# Patient Record
Sex: MALE | Race: WHITE | Employment: FULL TIME | URBAN - METROPOLITAN AREA
[De-identification: names, ages, dates, MRNs, and addresses within clinical notes are randomized per-mention and may not be internally consistent; named-entity substitution may affect disease eponyms.]

---

## 2020-10-01 ENCOUNTER — OFFICE VISIT (OUTPATIENT)
Dept: NEUROLOGY | Facility: CLINIC | Age: 45
End: 2020-10-01
Payer: COMMERCIAL

## 2020-10-01 VITALS
HEART RATE: 81 BPM | BODY MASS INDEX: 26.61 KG/M2 | HEIGHT: 78 IN | DIASTOLIC BLOOD PRESSURE: 84 MMHG | RESPIRATION RATE: 18 BRPM | WEIGHT: 230 LBS | SYSTOLIC BLOOD PRESSURE: 144 MMHG

## 2020-10-01 DIAGNOSIS — H53.413 VISION LOSS, CENTRAL, BILATERAL: Primary | ICD-10-CM

## 2020-10-01 DIAGNOSIS — R90.89 ABNORMAL FINDING ON MRI OF BRAIN: ICD-10-CM

## 2020-10-01 PROCEDURE — 99205 OFFICE O/P NEW HI 60 MIN: CPT | Performed by: PSYCHIATRY & NEUROLOGY

## 2020-10-01 RX ORDER — GUAIFENESIN 100 MG/5ML
81 LIQUID (ML) ORAL DAILY
COMMUNITY

## 2020-10-01 NOTE — PROGRESS NOTES
Neurology Consult Note      HISTORY PROVIDED BY: patient    Chief Complaint:   Chief Complaint   Patient presents with    Loss of Vision      Subjective:    Ursula Espinosa is a 38 y.o. right handed male who presents in consultation for vision loss. Pt reminds me that I evaluated him 8 years ago at [facility], for spots in vision and tingling on right side that improved spontaneously. He had an MRI brain at that time with white matter lesions concerning for demyelinating disease. LP was unremarkable with no OCB seen, CSF protein 53, remainder of evaluation was negative (Lyme Ab, RPR, VIVIANA, CSF cytology, Cell counts, Glu, VDRL, culture.) He was too follow up for repeat MRI brain, but had a son and was lost to follow up. He denies any additional symptoms until a spell of vision loss a couple of months ago. He was playing tennis, after 10 minutes, he saw blind spots in bilateral central vision and took a couple of hours to recover. Happened again 3 weeks ago, had just finished shopping, was not doing anything strenuous. Lasted a couple of hours. With this second episode, his face felt numb and people sounded far away. He has also had numbness in hands and feet and has attributed this to sitting throughout the day in a non-erognomic work station at home during the pandemic. Dr. Pacheco Persons checked labs and  and has been given 3 months to get that number down before meds started, o/w work up was negative. Has a dog that is keeping him awake recently, so has been sleep deprived. No HAs associated with these sxs. Has mild HAs o/w. His sister, who is a nurse, had word salad one day, no etiology found, but did have white matter changes seen on MRI brain as well.      Past Medical History:   Diagnosis Date    Kidney stone     Other ill-defined conditions(629.98)     \"lesions on brain\"      Past Surgical History:   Procedure Laterality Date    HX HEENT      wisdom teeth       Social History     Socioeconomic History    Marital status: SINGLE     Spouse name: Not on file    Number of children: Not on file    Years of education: Not on file    Highest education level: Not on file   Occupational History    Occupation: Works at [employer]. Social Needs    Financial resource strain: Not on file    Food insecurity     Worry: Not on file     Inability: Not on file    Transportation needs     Medical: Not on file     Non-medical: Not on file   Tobacco Use    Smoking status: Never Smoker    Smokeless tobacco: Never Used   Substance and Sexual Activity    Alcohol use: Yes     Alcohol/week: 15.0 - 20.0 standard drinks     Types: 15 - 20 Cans of beer per week    Drug use: No    Sexual activity: Not on file   Lifestyle    Physical activity     Days per week: Not on file     Minutes per session: Not on file    Stress: Not on file   Relationships    Social connections     Talks on phone: Not on file     Gets together: Not on file     Attends Voodoo service: Not on file     Active member of club or organization: Not on file     Attends meetings of clubs or organizations: Not on file     Relationship status: Not on file    Intimate partner violence     Fear of current or ex partner: Not on file     Emotionally abused: Not on file     Physically abused: Not on file     Forced sexual activity: Not on file   Other Topics Concern    Not on file   Social History Narrative    Lives in [place] with wife and 9yo son, currently doing school virtually. Family History   Problem Relation Age of Onset    Lung Cancer Mother         Non-smoker    High Cholesterol Mother     Stroke Father         Age 54yo    Other Father         Trauma at young age, inactive    Hypertension Father     High Cholesterol Father     Stroke Paternal Grandfather         Age 67yo         Objective:   Review of Systems   Constitutional: Positive for malaise/fatigue. HENT: Negative. Eyes: Negative. Respiratory: Negative.     Cardiovascular: Negative. Gastrointestinal: Negative. Genitourinary: Negative. Musculoskeletal: Positive for joint pain. Skin: Negative. Neurological: Positive for dizziness and headaches. Endo/Heme/Allergies: Negative. Psychiatric/Behavioral: The patient is nervous/anxious. No Known Allergies     Meds:  Outpatient Medications Prior to Visit   Medication Sig Dispense Refill    aspirin 81 mg chewable tablet Take 81 mg by mouth daily.  oxyCODONE-acetaminophen (PERCOCET) 5-325 mg per tablet Take 1 Tab by mouth every four (4) hours as needed for Pain. 30 Tab 0     No facility-administered medications prior to visit. Imaging:  MRI Results (most recent):  No results found for this or any previous visit. CT Results (most recent):  Results from Hospital Encounter encounter on 05/13/14   CT ABD PELV WO CONT    Narrative **Final Report**       ICD Codes / Adm. Diagnosis: 742738   / Flank Pain    Examination:  CT ABD PELVIS WO CON  - [id number] - May 13 2014  2:47PM  Accession No:  [id number]  Reason:  r flank pain      REPORT:  INDICATION: Right flank pain. Exam: Noncontrast CT of the abdomen and pelvis is performed with 5 mm   collimation. Sagittal and coronal reformatted images were also performed. There is no prior study for direct comparison. FINDINGS:    The visualized lung bases are clear. Abdomen: There is right perinephric stranding, right hydronephrosis and   right hydroureter to level of the distal right ureter where there is a 6 mm   calculus. Additional bilateral nephrolithiasis is noted. The liver, spleen, adrenals, pancreas, gallbladder are normal on noncontrast   images. No thickened or dilated loop of large or small bowel seen. There is   no free intraperitoneal gas or fluid.     Pelvis: Urinary bladder is partially filled and grossly normal.        IMPRESSION: Right perinephric stranding, right hydronephrosis and right   hydroureter to level of the distal right ureter where there is a 6 mm   calculus. Signing/Reading Doctor: ANGI Su ([id number])    Approved: ANGI Su ([id number])  May 13 2014  3:55PM                                       Reviewed records in [facility] and media tab today    Lab Review   Results for orders placed or performed during the hospital encounter of 05/13/14   URINALYSIS W/ REFLEX CULTURE    Specimen: Urine   Result Value Ref Range    Color YELLOW/STRAW     Appearance TURBID (A) CLEAR    Specific gravity 1.020 1.003 - 1.030      pH (UA) 7.5 5.0 - 8.0      Protein 30 (A) NEGATIVE mg/dL    Glucose NEGATIVE  NEGATIVE mg/dL    Ketone TRACE (A) NEGATIVE mg/dL    Bilirubin NEGATIVE  NEGATIVE    Blood MODERATE (A) NEGATIVE    Urobilinogen 0.2 0.2 - 1.0 EU/dL    Nitrites NEGATIVE  NEGATIVE    Leukocyte Esterase NEGATIVE  NEGATIVE    WBC 0-4 0 - 4 /hpf    RBC 20-50 0 - 5 /hpf    Epithelial cells FEW FEW /lpf    Bacteria NEGATIVE  NEGATIVE /hpf    UA:UC IF INDICATED CULTURE NOT INDICATED BY UA RESULT CULTURE NOT INDICATE    Amorphous Crystals 3+ (A) NEGATIVE          Exam:  Visit Vitals  BP (!) 144/84   Pulse 81   Resp 18   Ht 6' 6\" (1.981 m)   Wt 104.3 kg (230 lb)   BMI 26.58 kg/m²     General:  Alert, cooperative, no distress. Head:  Normocephalic, without obvious abnormality, atraumatic. Respiratory:  Heart:   Non labored breathing  Regular rate and rhythm, no murmurs   Neck:   2+ carotids, no bruits   Extremities: Warm, no cyanosis or edema. Pulses: 2+ radial pulses. Neurologic:  MS: Alert and oriented x 4, speech intact. Language intact. Attention and fund of knowledge appropriate. Recent and remote memory intact.   Cranial Nerves:  II: visual fields Full to confrontation   II: pupils Equal, round, reactive to light   II: optic disc    III,VII: ptosis none   III,IV,VI: extraocular muscles  EOMI, no nystagmus or diplopia   V: facial light touch sensation  normal   VII: facial muscle function   symmetric   VIII: hearing intact   IX: soft palate elevation  normal   XI: trapezius strength  5/5   XI: sternocleidomastoid strength 5/5   XII: tongue  Midline     Motor: normal bulk and tone, no tremor              Strength: 5/5 throughout, no PD  Sensory: intact to LT, PP  Coordination: FTN and HTS intact, PEG intact  Gait: normal gait, able to tandem walk  Reflexes: 2+ symmetric, toes downgoing       Assessment/Plan   Pt is a 38 y.o. right handed male with h/o spots in his vision and right sided tingling 8 years ago found to have white matter lesions on MRI brain at that time concerning for demyelinating disease, negative OCB on LP, lost to follow up, now with two episodes of sudden central vision loss bilaterally, the first a couple of months ago and the second 3 weeks ago, lasting for two hours each time, had facial numbness and voices sounded far away during the second spell. Exam is non-focal and unremarkable. Intermittent bilateral painless central vision loss is concerning for possible central retinal artery occlusion or macular abnormality, though it is unusual that this was bilateral, raising concern for decreased perfusion, unlikely to be a chiasmal lesion. Migraine aura is in differential, but was not associated with HA and pt does not have h/o migraine, this is a diagnosis of exclusion. Recommend MRI brain w/wo contrast to reassess previously seen white matter lesions and rule out structural etiology for vision loss, and CTA head and neck to assess for vascular etiology of vision loss. Further work up pending these results. Referral to ophthalmology placed. Will request records and labs from PCP and notes from [facility]. F/u in clinic after studies completed. .    ICD-10-CM ICD-9-CM    1. Vision loss, central, bilateral  H53.413 368.41 MRI BRAIN W WO CONT      CTA HEAD NECK W CONT      REFERRAL TO OPHTHALMOLOGY   2. Abnormal finding on MRI of brain  R90.89 793.0 MRI BRAIN W WO CONT      CTA HEAD NECK W CONT       Signed:   Sheela Kaur, MD  10/1/2020

## 2020-10-01 NOTE — PATIENT INSTRUCTIONS
-Please see an ophthalmologist 
-MRI brain ordered 
-CTA of head and neck ordered 
-Please attempt to obtain prior imaging on CD from [facility] and bring to appointment for MRI so that a comparison can be made.

## 2020-10-01 NOTE — LETTER
10/1/20 Patient: Kendy Chamberlain YOB: 1975 Date of Visit: 10/1/2020 Anibal Santiago MD 
[address] [place] [id number] VIA Facsimile: [phone] Dear Anibal Santiago MD, Thank you for referring Mr. Sebastian Bryson to [address] [place] [address] for evaluation. My notes for this consultation are attached. If you have questions, please do not hesitate to call me. I look forward to following your patient along with you. Sincerely, Patrice Potter MD

## 2020-10-02 ENCOUNTER — DOCUMENTATION ONLY (OUTPATIENT)
Dept: NEUROLOGY | Facility: CLINIC | Age: 45
End: 2020-10-02

## 2020-10-02 NOTE — PROGRESS NOTES
I have requested records and imaging/discs from [facility]. Requested notes and labs from Dr. Elaine Matthews PCP.

## 2020-10-14 ENCOUNTER — TELEPHONE (OUTPATIENT)
Dept: NEUROLOGY | Facility: CLINIC | Age: 45
End: 2020-10-14

## 2020-10-14 NOTE — TELEPHONE ENCOUNTER
----- Message from Noemi Dean sent at 10/13/2020  4:32 PM EDT -----  Regarding: Dr. Ordonez   General Message/Vendor Calls    Caller's first and last name: George w[employer] [address]      Reason for call: Advising pt is having a CT scan of the head and neck w/contrast at [facility]. Case pending with [facility], please call [phone] and follow prompts for peer to peer.        Callback required yes/no and why: No      Best contact number(s): [phone]      Details to clarify the request:      Noemi Dean

## 2020-10-14 NOTE — TELEPHONE ENCOUNTER
Tavia Gonsalez - Do I WANT to, no, WILL I, yes. 14 minutes and 50 seconds of my unpaid time and several absurd questions later, it is approved by a nurse reviewer.      CTA Head and Neck  Authorized [id number] exp 10/13/20 - 12/11/20

## 2020-10-16 ENCOUNTER — HOSPITAL ENCOUNTER (OUTPATIENT)
Dept: MRI IMAGING | Age: 45
Discharge: HOME | End: 2020-10-16
Attending: PSYCHIATRY & NEUROLOGY
Payer: COMMERCIAL

## 2020-10-16 ENCOUNTER — HOSPITAL ENCOUNTER (OUTPATIENT)
Dept: CT IMAGING | Age: 45
Discharge: HOME | End: 2020-10-16
Attending: PSYCHIATRY & NEUROLOGY
Payer: COMMERCIAL

## 2020-10-16 VITALS — BODY MASS INDEX: 26.58 KG/M2 | WEIGHT: 230 LBS

## 2020-10-16 DIAGNOSIS — R90.89 ABNORMAL FINDING ON MRI OF BRAIN: ICD-10-CM

## 2020-10-16 DIAGNOSIS — H53.413 VISION LOSS, CENTRAL, BILATERAL: ICD-10-CM

## 2020-10-16 PROCEDURE — A9575 INJ GADOTERATE MEGLUMI 0.1ML: HCPCS | Performed by: PSYCHIATRY & NEUROLOGY

## 2020-10-16 PROCEDURE — 74011000636 HC RX REV CODE- 636: Performed by: RADIOLOGY

## 2020-10-16 PROCEDURE — 70498 CT ANGIOGRAPHY NECK: CPT

## 2020-10-16 PROCEDURE — 74011000258 HC RX REV CODE- 258: Performed by: RADIOLOGY

## 2020-10-16 PROCEDURE — 74011250636 HC RX REV CODE- 250/636: Performed by: PSYCHIATRY & NEUROLOGY

## 2020-10-16 PROCEDURE — 70496 CT ANGIOGRAPHY HEAD: CPT

## 2020-10-16 PROCEDURE — 70553 MRI BRAIN STEM W/O & W/DYE: CPT

## 2020-10-16 RX ORDER — SODIUM CHLORIDE 0.9 % (FLUSH) 0.9 %
10 SYRINGE (ML) INJECTION
Status: COMPLETED | OUTPATIENT
Start: 2020-10-16 | End: 2020-10-16

## 2020-10-16 RX ORDER — GADOTERATE MEGLUMINE 376.9 MG/ML
20 INJECTION INTRAVENOUS
Status: COMPLETED | OUTPATIENT
Start: 2020-10-16 | End: 2020-10-16

## 2020-10-16 RX ADMIN — GADOTERATE MEGLUMINE 20 ML: 376.9 INJECTION INTRAVENOUS at 13:35

## 2020-10-16 RX ADMIN — SODIUM CHLORIDE 100 ML: 900 INJECTION, SOLUTION INTRAVENOUS at 15:12

## 2020-10-16 RX ADMIN — Medication 10 ML: at 15:12

## 2020-10-16 RX ADMIN — IOPAMIDOL 100 ML: 755 INJECTION, SOLUTION INTRAVENOUS at 15:12

## 2020-10-25 NOTE — PROGRESS NOTES
Isamar Friends - Please call pt: MRI brain w/wo contrast 10/16/20 without acute or concerning findings, can review together at f/u appt. CTA H/N 10/16/20 look good without any areas of narrowing or other etiology for vision loss.

## 2020-10-25 NOTE — PROGRESS NOTES
Dejuan Holden - Please call pt: MRI brain w/wo contrast 10/16/20 without acute or concerning findings, can review together at f/u appt. CTA H/N 10/16/20 look good without any areas of narrowing or other etiology for vision loss.

## 2020-10-25 NOTE — PROGRESS NOTES
Dodie Pool - Please call pt: MRI brain w/wo contrast 10/16/20 without acute or concerning findings, can review together at f/u appt. CTA H/N 10/16/20 look good without any areas of narrowing or other etiology for vision loss.

## 2020-11-04 ENCOUNTER — DOCUMENTATION ONLY (OUTPATIENT)
Dept: NEUROLOGY | Facility: CLINIC | Age: 45
End: 2020-11-04

## 2020-11-04 NOTE — PROGRESS NOTES
Notes from [facility] received and reviewed:  Initial consultation [id number]-dpax-old right-handed male with 3 spells and 5 years of acute onset of right-sided numbness, language disturbance, visual changes, and \"fogginess\" without associated headache with family history of stroke in his father at age 48 years and epilepsy in his sister. Exam was nonfocal CT of the head with bifrontal white matter hyperdensities and asymmetry of the lateral ventricles. EEG 1/24/2012 normal awake EEG  Follow-up visit 2/27/2012-MRI brain with and without contrast at [facility] on 1/30/2012 moderate amount of white matter disease bilaterally with mild abnormal signal along the undersurface of the corpus callosum extending into the anterior left aspect of the body of the corpus callosum concerning for MS. , hemoglobin A1c 5.9  Follow-up visit 4/9/2012-LP 3/20/2012 with normal opening pressure, negative OGB, IgG synthesis rate slightly elevated at 4.0, IgG index was normal, protein slightly elevated at 53, glucose 56, VDRL, B. burgdorferi antibodies negative, cytology negative. Blood work VIVIANA, RPR, Lyme Western blot negative. Recommended follow-up MRI in October 2012. Records sent to scanning.

## 2021-02-18 ENCOUNTER — VIRTUAL VISIT (OUTPATIENT)
Dept: NEUROLOGY | Age: 46
End: 2021-02-18
Payer: COMMERCIAL

## 2021-02-18 DIAGNOSIS — H53.413 VISION LOSS, CENTRAL, BILATERAL: Primary | ICD-10-CM

## 2021-02-18 DIAGNOSIS — R90.89 ABNORMAL FINDING ON MRI OF BRAIN: ICD-10-CM

## 2021-02-18 PROCEDURE — 99213 OFFICE O/P EST LOW 20 MIN: CPT | Performed by: PSYCHIATRY & NEUROLOGY

## 2021-02-18 NOTE — PROGRESS NOTES
Neurology Consult Note      HISTORY PROVIDED BY: patient  Juarez Gold is a 41 y.o. male who was seen by synchronous (real-time) audio-video technology on 2/18/2021. Two factor identification completed. Consent:  He and/or his healthcare decision maker is aware that this patient-initiated Telehealth encounter is a billable service, with coverage as determined by his insurance carrier. He is aware that he may receive a bill and has provided verbal consent to proceed: Yes    I was at home while conducting this encounter. I discussed with the patient the nature of our telemedicine visit: Our sessions are not being recorded and personal health information is protected. We will provide follow up care in person when the patient needs it. Pursuant to the emergency declaration under the [address] [place] [address], [facility] waiver authority and the [employer] Act, this Virtual  Visit was conducted, with patient's consent, to reduce the patient's risk of exposure to COVID-19 and provide continuity of care for an established patient. Chief Complaint:   Chief Complaint   Patient presents with    Follow-up     vision loss, \"it has been fine I have not had any new episodes of vision loss. \"      Subjective:   Pt is a 41 y.o. right handed male initially and last seen on 10/1/20 with h/o spots in his vision and right sided tingling 8 years ago found to have white matter lesions on MRI brain at that time concerning for demyelinating disease, negative OCB on LP, lost to follow up, now with two episodes of sudden central vision loss bilaterally, the first a couple of months ago and the second 3 weeks ago, lasting for two hours each time, had facial numbness and voices sounded far away during the second spell. Exam was non-focal and unremarkable.   Intermittent bilateral painless central vision loss is concerning for possible central retinal artery occlusion or macular abnormality, though it is unusual that this was bilateral, raising concern for decreased perfusion, unlikely to be a chiasmal lesion. Migraine aura is in differential, but was not associated with HA and pt does not have h/o migraine, this is a diagnosis of exclusion. Recommended MRI brain w/wo contrast to reassess previously seen white matter lesions and rule out structural etiology for vision loss, and CTA head and neck to assess for vascular etiology of vision loss. Further work up pending these results. Referral to ophthalmology placed. Reequested records and labs from PCP and notes from [facility]. He returns for virtual f/u. MRI brain w/wo contrast 10/16/20 without acute or concerning findings, white matter lesions seen, felt to be more c/w CIWM changes and atypical for demyelinating disease. CTA H/N 10/16/20 look good without any areas of narrowing or other etiology for vision loss. He has not had any further episodes of vision loss. He had hand and foot numbness, intermittent improved with changing position at his desk, using a standup desk now. He denies HTN, he does have HLD - has changed his diet and increased CV activity and PCP plans to recheck in a couple of months, no smoking, no DM, no sleep apnea. Notes from [facility] received and reviewed:  Initial consultation [id number]-old right-handed male with 3 spells and 5 years of acute onset of right-sided numbness, language disturbance, visual changes, and \"fogginess\" without associated headache with family history of stroke in his father at age 48 years and epilepsy in his sister. CT of the head with bifrontal white matter hyperdensities and asymmetry of the lateral ventricles. EEG 1/24/2012 normal awake EEG.  MRI brain with and without contrast at [address] on 1/30/2012 moderate amount of white matter disease bilaterally with mild abnormal signal along the undersurface of the corpus callosum extending into the anterior left aspect of the body of the corpus callosum concerning for MS. LP 3/20/2012 with normal opening pressure, negative OGB, IgG synthesis rate slightly elevated at 4.0, IgG index was normal, protein slightly elevated at 53, glucose 56,  VDRL, B. burgdorferi antibodies negative, cytology negative. Blood work VIVIANA, RPR, Lyme Western blot negative. Past Medical History:   Diagnosis Date    Kidney stone     Other ill-defined conditions(372.92)     \"lesions on brain\"      Past Surgical History:   Procedure Laterality Date    HX HEENT      wisdom teeth       Social History     Socioeconomic History    Marital status: SINGLE     Spouse name: Not on file    Number of children: Not on file    Years of education: Not on file    Highest education level: Not on file   Occupational History    Occupation: Works at [employer]. Social Needs    Financial resource strain: Not on file    Food insecurity     Worry: Not on file     Inability: Not on file    Transportation needs     Medical: Not on file     Non-medical: Not on file   Tobacco Use    Smoking status: Never Smoker    Smokeless tobacco: Never Used   Substance and Sexual Activity    Alcohol use:  Yes     Alcohol/week: 15.0 - 20.0 standard drinks     Types: 15 - 20 Cans of beer per week    Drug use: No    Sexual activity: Not on file   Lifestyle    Physical activity     Days per week: Not on file     Minutes per session: Not on file    Stress: Not on file   Relationships    Social connections     Talks on phone: Not on file     Gets together: Not on file     Attends Confucianist service: Not on file     Active member of club or organization: Not on file     Attends meetings of clubs or organizations: Not on file     Relationship status: Not on file    Intimate partner violence     Fear of current or ex partner: Not on file     Emotionally abused: Not on file     Physically abused: Not on file     Forced sexual activity: Not on file   Other Topics Concern    Not on file   Social History Narrative    Lives in [place] with wife and 8yo son, currently doing school virtually. Family History   Problem Relation Age of Onset    Lung Cancer Mother         Non-smoker    High Cholesterol Mother     Stroke Father         Age 47yo    Other Father         Trauma at young age, inactive    Hypertension Father     High Cholesterol Father     Stroke Paternal Grandfather         Age 67yo         Objective:   ROS: Per HPI o/w reviewed and negative    No Known Allergies     Meds:  Outpatient Medications Prior to Visit   Medication Sig Dispense Refill    aspirin 81 mg chewable tablet Take 81 mg by mouth daily. No facility-administered medications prior to visit. Imaging:  MRI Results (most recent):  Results from [place] encounter on 10/16/20   MRI BRAIN W WO CONT    Narrative EXAM:  MRI BRAIN W WO CONT    INDICATION:    Pt with h/o white matter lesions concerning for demyelinating  disease, now with sudden bilateral central vision loss    COMPARISON:  None. CONTRAST: 20 ml Dotarem. TECHNIQUE:    Multiplanar multisequence acquisition without and with contrast of the brain and  orbits. FINDINGS:  The globes are normal. The optic nerves are normal in size and signal. The  extraocular muscles are normal. No evidence of intraorbital mass or abnormal  intraorbital enhancement. The ventricles are normal in size and position. There are scattered  predominantly subcortical T2/FLAIR white matter hyperintensities, with no  involvement of the callosal septal interface, most suggestive of mild chronic  microvascular ischemic disease. There is no acute infarct, hemorrhage,  extra-axial fluid collection, or mass effect. There is no cerebellar tonsillar  herniation. Expected arterial flow-voids are present. No evidence of abnormal  enhancement. The paranasal sinuses, mastoid air cells, and middle ears are clear.  No  significant osseous or scalp lesions are identified. Impression IMPRESSION:     1. Normal appearance of the orbits. No acute intracranial abnormality. 2. Scattered predominantly subcortical T2/FLAIR white matter hyperintensities,  most suggestive of mild chronic microvascular ischemic disease. The distribution  would be very atypical for demyelinating disease. CT Results (most recent):  Results from Hospital Encounter encounter on 10/16/20   CTA NECK    Narrative EXAM:  CTA HEAD, CTA NECK  Pelvis: Sudden onset bilateral headache  INDICATION:   Pt with sudden onset of bilateral central vision loss, eval for  vascular etiology    COMPARISON:  None    CONTRAST: mL of [id number]. TECHNIQUE:   Unenhanced CT of the head was performed. Following this, multislice helical CT  angiography of the head and neck was performed during uneventful rapid bolus  intravenous administration of contrast. Coronal and sagittal reformations and  MIP images and 3D shaded surface display renderings were generated. CT dose  reduction was achieved through use of a standardized protocol tailored for this  examination and automatic exposure control for dose modulation. FINDINGS:  Unenhanced CT:  The ventricles are midline without hydrocephalus. There is no acute intra or  extra-axial fluid collection. Few scattered foci of hypodensity in the cerebral  white matter on the pattern suggestive of chronic microvascular ischemic change. No pulmonary mass or nodule. .  No large thyroid lesion. .    CTA:    Three-vessel aortic arch. Left vertebral artery is larger than the right  vertebral artery. No dissection cervical soft tissues are grossly unremarkable. Cervical soft tissues are within normal limits. . The basilar artery and its  branches are normal.    The A2 segments are patent. A1 segments are present. M1 and M2 segments are  patent. Symmetric arborization. P1 segments are within normal limits. P2  segments are within normal limits.  The internal carotid, anterior cerebral, and  middle cerebral arteries are patent. There is no flow-limiting intracranial  stenosis. There is no aneurysm. There are no sizable posterior communicating  arteries. Impression IMPRESSION:  Scattered hypodensities in the cerebral white matter are nonspecific. Most  likely related to chronic microvascular ischemic change . There is no major vessel occlusion. No flow limiting stenosis or intracranial  aneurysm. Reviewed records in [facility] today    Lab Review   Results for orders placed or performed during the hospital encounter of 05/13/14   URINALYSIS W/ REFLEX CULTURE    Specimen: Urine   Result Value Ref Range    Color YELLOW/STRAW     Appearance TURBID (A) CLEAR    Specific gravity 1.020 1.003 - 1.030      pH (UA) 7.5 5.0 - 8.0      Protein 30 (A) NEGATIVE mg/dL    Glucose NEGATIVE  NEGATIVE mg/dL    Ketone TRACE (A) NEGATIVE mg/dL    Bilirubin NEGATIVE  NEGATIVE    Blood MODERATE (A) NEGATIVE    Urobilinogen 0.2 0.2 - 1.0 EU/dL    Nitrites NEGATIVE  NEGATIVE    Leukocyte Esterase NEGATIVE  NEGATIVE    WBC 0-4 0 - 4 /hpf    RBC 20-50 0 - 5 /hpf    Epithelial cells FEW FEW /lpf    Bacteria NEGATIVE  NEGATIVE /hpf    UA:UC IF INDICATED CULTURE NOT INDICATED BY UA RESULT CULTURE NOT INDICATE    Amorphous Crystals 3+ (A) NEGATIVE        Exam limited due to VV, unable to assess VS  Exam:  There were no vitals taken for this visit. General:  Alert, cooperative, no distress. Head:  Normocephalic, without obvious abnormality, atraumatic. Respiratory:  Heart:   Non labored breathing     Neck:      Extremities:    Pulses:        Neurologic:  MS: Alert and oriented x 4, speech intact. Language intact. Attention and fund of knowledge appropriate. Recent and remote memory intact.     Exam Oct, 2020:  Cranial Nerves:  II: visual fields Full to confrontation   II: pupils Equal, round, reactive to light   II: optic disc    III,VII: ptosis none III,IV,VI: extraocular muscles  EOMI, no nystagmus or diplopia   V: facial light touch sensation  normal   VII: facial muscle function   symmetric   VIII: hearing intact   IX: soft palate elevation  normal   XI: trapezius strength  5/5   XI: sternocleidomastoid strength 5/5   XII: tongue  Midline     Motor: normal bulk and tone, no tremor              Strength: 5/5 throughout, no PD  Sensory: intact to LT, PP  Coordination: FTN and HTS intact, PEG intact  Gait: normal gait, able to tandem walk  Reflexes: 2+ symmetric, toes downgoing       Assessment/Plan   Pt is a 41 y.o. right handed male presenting in Oct, 2020 with h/o spots in his vision and right sided tingling 8 years ago found to have white matter lesions on MRI brain at that time concerning for demyelinating disease, negative OCB on LP, now with two episodes of sudden central vision loss bilaterally, the first a couple of months prior and the second 3 weeks prior, lasting for two hours each time, had facial numbness and voices sounded far away during the second spell. Exam was non-focal and unremarkable. Intermittent bilateral painless central vision loss is concerning for possible central retinal artery occlusion or macular abnormality, though it is unusual that this was bilateral, raising concern for decreased perfusion, unlikely to be a chiasmal lesion. Migraine aura is in differential, but was not associated with HA and pt does not have h/o migraine. MRI brain w/wo contrast 10/16/20 and CTA H/N 10/16/20 without any concerning findings or etiology for vision loss. New c/o intermittent numbness in hands and feet, likely positional and related to compression with positioning, could have mild mononeuropathies in UE, but not concerning unless numbness were to become more persistent. Discussed chronic ischemic changes on MRI. He has risk factor of HLD, being evaluated and managed by PCP. Recommend continuing ASA 81mg daily.    F/u in clinic in 6 months, instructed to call in the interim if needed. .    ICD-10-CM ICD-9-CM    1. Vision loss, central, bilateral  H53.413 368.41    2. Abnormal finding on MRI of brain  R90.89 793.0        Signed:   Albert Wen MD  2/18/2021

## 2023-08-01 SDOH — HEALTH STABILITY: PHYSICAL HEALTH: ON AVERAGE, HOW MANY MINUTES DO YOU ENGAGE IN EXERCISE AT THIS LEVEL?: 30 MIN

## 2023-08-01 SDOH — HEALTH STABILITY: PHYSICAL HEALTH: ON AVERAGE, HOW MANY DAYS PER WEEK DO YOU ENGAGE IN MODERATE TO STRENUOUS EXERCISE (LIKE A BRISK WALK)?: 4 DAYS

## 2023-08-02 ENCOUNTER — OFFICE VISIT (OUTPATIENT)
Age: 48
End: 2023-08-02
Payer: COMMERCIAL

## 2023-08-02 VITALS
HEART RATE: 60 BPM | HEIGHT: 78 IN | BODY MASS INDEX: 26.61 KG/M2 | DIASTOLIC BLOOD PRESSURE: 92 MMHG | SYSTOLIC BLOOD PRESSURE: 140 MMHG | RESPIRATION RATE: 16 BRPM | OXYGEN SATURATION: 98 % | WEIGHT: 230 LBS | TEMPERATURE: 97.7 F

## 2023-08-02 DIAGNOSIS — Z13.1 SCREENING FOR DIABETES MELLITUS: ICD-10-CM

## 2023-08-02 DIAGNOSIS — Z12.11 SCREENING FOR COLON CANCER: ICD-10-CM

## 2023-08-02 DIAGNOSIS — R05.8 POST-VIRAL COUGH SYNDROME: Primary | ICD-10-CM

## 2023-08-02 DIAGNOSIS — Z12.5 SCREENING FOR PROSTATE CANCER: ICD-10-CM

## 2023-08-02 DIAGNOSIS — R03.0 ELEVATED BLOOD-PRESSURE READING WITHOUT DIAGNOSIS OF HYPERTENSION: ICD-10-CM

## 2023-08-02 DIAGNOSIS — E66.3 OVERWEIGHT (BMI 25.0-29.9): ICD-10-CM

## 2023-08-02 DIAGNOSIS — E78.2 MIXED HYPERLIPIDEMIA: ICD-10-CM

## 2023-08-02 PROBLEM — N20.0 KIDNEY STONES: Status: ACTIVE | Noted: 2023-08-02

## 2023-08-02 PROCEDURE — 99204 OFFICE O/P NEW MOD 45 MIN: CPT | Performed by: NURSE PRACTITIONER

## 2023-08-02 RX ORDER — BENZONATATE 200 MG/1
1 CAPSULE ORAL 3 TIMES DAILY PRN
COMMUNITY
Start: 2023-07-27

## 2023-08-02 ASSESSMENT — ENCOUNTER SYMPTOMS
EYE REDNESS: 0
SINUS PRESSURE: 0
RHINORRHEA: 0
NAUSEA: 0
BLOOD IN STOOL: 0
CHEST TIGHTNESS: 0
DIARRHEA: 0
EYES NEGATIVE: 1
BACK PAIN: 0
COUGH: 1
SINUS PAIN: 0
ABDOMINAL PAIN: 0
GASTROINTESTINAL NEGATIVE: 1
SHORTNESS OF BREATH: 0
VOMITING: 0
EYE PAIN: 0
CONSTIPATION: 0

## 2023-08-02 ASSESSMENT — PATIENT HEALTH QUESTIONNAIRE - PHQ9
SUM OF ALL RESPONSES TO PHQ QUESTIONS 1-9: 0
1. LITTLE INTEREST OR PLEASURE IN DOING THINGS: 0
2. FEELING DOWN, DEPRESSED OR HOPELESS: 0
SUM OF ALL RESPONSES TO PHQ9 QUESTIONS 1 & 2: 0

## 2023-08-24 DIAGNOSIS — Z12.5 SCREENING FOR PROSTATE CANCER: ICD-10-CM

## 2023-08-24 DIAGNOSIS — E78.2 MIXED HYPERLIPIDEMIA: ICD-10-CM

## 2023-08-24 DIAGNOSIS — Z13.1 SCREENING FOR DIABETES MELLITUS: ICD-10-CM

## 2023-08-24 DIAGNOSIS — R05.8 POST-VIRAL COUGH SYNDROME: ICD-10-CM

## 2023-08-24 DIAGNOSIS — R03.0 ELEVATED BLOOD-PRESSURE READING WITHOUT DIAGNOSIS OF HYPERTENSION: ICD-10-CM

## 2023-08-24 LAB
ALBUMIN SERPL-MCNC: 3.9 G/DL (ref 3.5–5)
ALBUMIN/GLOB SERPL: 1.2 (ref 1.1–2.2)
ALP SERPL-CCNC: 91 U/L (ref 45–117)
ALT SERPL-CCNC: 27 U/L (ref 12–78)
ANION GAP SERPL CALC-SCNC: 2 MMOL/L (ref 5–15)
AST SERPL-CCNC: 14 U/L (ref 15–37)
BASOPHILS # BLD: 0 K/UL (ref 0–0.1)
BASOPHILS NFR BLD: 1 % (ref 0–1)
BILIRUB SERPL-MCNC: 0.5 MG/DL (ref 0.2–1)
BUN SERPL-MCNC: 15 MG/DL (ref 6–20)
BUN/CREAT SERPL: 13 (ref 12–20)
CALCIUM SERPL-MCNC: 9.1 MG/DL (ref 8.5–10.1)
CHLORIDE SERPL-SCNC: 107 MMOL/L (ref 97–108)
CHOLEST SERPL-MCNC: 281 MG/DL
CO2 SERPL-SCNC: 29 MMOL/L (ref 21–32)
CREAT SERPL-MCNC: 1.19 MG/DL (ref 0.7–1.3)
CREAT UR-MCNC: 192 MG/DL
DIFFERENTIAL METHOD BLD: NORMAL
EOSINOPHIL # BLD: 0.1 K/UL (ref 0–0.4)
EOSINOPHIL NFR BLD: 1 % (ref 0–7)
ERYTHROCYTE [DISTWIDTH] IN BLOOD BY AUTOMATED COUNT: 12.2 % (ref 11.5–14.5)
EST. AVERAGE GLUCOSE BLD GHB EST-MCNC: 105 MG/DL
GLOBULIN SER CALC-MCNC: 3.3 G/DL (ref 2–4)
GLUCOSE SERPL-MCNC: 103 MG/DL (ref 65–100)
HBA1C MFR BLD: 5.3 % (ref 4–5.6)
HCT VFR BLD AUTO: 44.5 % (ref 36.6–50.3)
HDLC SERPL-MCNC: 41 MG/DL
HDLC SERPL: 6.9 (ref 0–5)
HGB BLD-MCNC: 14.5 G/DL (ref 12.1–17)
IMM GRANULOCYTES # BLD AUTO: 0 K/UL (ref 0–0.04)
IMM GRANULOCYTES NFR BLD AUTO: 0 % (ref 0–0.5)
LDLC SERPL CALC-MCNC: 192.2 MG/DL (ref 0–100)
LYMPHOCYTES # BLD: 1.7 K/UL (ref 0.8–3.5)
LYMPHOCYTES NFR BLD: 43 % (ref 12–49)
MCH RBC QN AUTO: 30.5 PG (ref 26–34)
MCHC RBC AUTO-ENTMCNC: 32.6 G/DL (ref 30–36.5)
MCV RBC AUTO: 93.5 FL (ref 80–99)
MICROALBUMIN UR-MCNC: 0.73 MG/DL
MICROALBUMIN/CREAT UR-RTO: 4 MG/G (ref 0–30)
MONOCYTES # BLD: 0.4 K/UL (ref 0–1)
MONOCYTES NFR BLD: 9 % (ref 5–13)
NEUTS SEG # BLD: 1.9 K/UL (ref 1.8–8)
NEUTS SEG NFR BLD: 46 % (ref 32–75)
NRBC # BLD: 0 K/UL (ref 0–0.01)
NRBC BLD-RTO: 0 PER 100 WBC
PLATELET # BLD AUTO: 155 K/UL (ref 150–400)
PMV BLD AUTO: 10.8 FL (ref 8.9–12.9)
POTASSIUM SERPL-SCNC: 4.2 MMOL/L (ref 3.5–5.1)
PROT SERPL-MCNC: 7.2 G/DL (ref 6.4–8.2)
PSA SERPL-MCNC: 0.5 NG/ML (ref 0.01–4)
RBC # BLD AUTO: 4.76 M/UL (ref 4.1–5.7)
SODIUM SERPL-SCNC: 138 MMOL/L (ref 136–145)
TRIGL SERPL-MCNC: 239 MG/DL
VLDLC SERPL CALC-MCNC: 47.8 MG/DL
WBC # BLD AUTO: 4.1 K/UL (ref 4.1–11.1)

## 2023-08-30 SDOH — ECONOMIC STABILITY: INCOME INSECURITY: HOW HARD IS IT FOR YOU TO PAY FOR THE VERY BASICS LIKE FOOD, HOUSING, MEDICAL CARE, AND HEATING?: NOT HARD AT ALL

## 2023-08-30 SDOH — ECONOMIC STABILITY: TRANSPORTATION INSECURITY
IN THE PAST 12 MONTHS, HAS LACK OF TRANSPORTATION KEPT YOU FROM MEETINGS, WORK, OR FROM GETTING THINGS NEEDED FOR DAILY LIVING?: NO

## 2023-08-30 SDOH — ECONOMIC STABILITY: FOOD INSECURITY: WITHIN THE PAST 12 MONTHS, YOU WORRIED THAT YOUR FOOD WOULD RUN OUT BEFORE YOU GOT MONEY TO BUY MORE.: NEVER TRUE

## 2023-08-30 SDOH — ECONOMIC STABILITY: FOOD INSECURITY: WITHIN THE PAST 12 MONTHS, THE FOOD YOU BOUGHT JUST DIDN'T LAST AND YOU DIDN'T HAVE MONEY TO GET MORE.: NEVER TRUE

## 2023-08-30 SDOH — ECONOMIC STABILITY: HOUSING INSECURITY
IN THE LAST 12 MONTHS, WAS THERE A TIME WHEN YOU DID NOT HAVE A STEADY PLACE TO SLEEP OR SLEPT IN A SHELTER (INCLUDING NOW)?: NO

## 2023-08-31 ENCOUNTER — OFFICE VISIT (OUTPATIENT)
Age: 48
End: 2023-08-31
Payer: COMMERCIAL

## 2023-08-31 VITALS
WEIGHT: 228 LBS | DIASTOLIC BLOOD PRESSURE: 75 MMHG | SYSTOLIC BLOOD PRESSURE: 116 MMHG | TEMPERATURE: 98 F | HEART RATE: 65 BPM | HEIGHT: 78 IN | BODY MASS INDEX: 26.38 KG/M2 | RESPIRATION RATE: 16 BRPM | OXYGEN SATURATION: 99 %

## 2023-08-31 DIAGNOSIS — E66.3 OVERWEIGHT (BMI 25.0-29.9): ICD-10-CM

## 2023-08-31 DIAGNOSIS — R03.0 ELEVATED BLOOD-PRESSURE READING WITHOUT DIAGNOSIS OF HYPERTENSION: ICD-10-CM

## 2023-08-31 DIAGNOSIS — R05.3 PERSISTENT COUGH: Primary | ICD-10-CM

## 2023-08-31 DIAGNOSIS — E78.2 MIXED HYPERLIPIDEMIA: ICD-10-CM

## 2023-08-31 DIAGNOSIS — N20.0 KIDNEY STONES: ICD-10-CM

## 2023-08-31 DIAGNOSIS — R73.01 ELEVATED FASTING GLUCOSE: ICD-10-CM

## 2023-08-31 PROCEDURE — 99204 OFFICE O/P NEW MOD 45 MIN: CPT | Performed by: NURSE PRACTITIONER

## 2023-08-31 RX ORDER — CETIRIZINE HYDROCHLORIDE 10 MG/1
10 TABLET ORAL DAILY
Qty: 30 TABLET | Refills: 0 | Status: SHIPPED | OUTPATIENT
Start: 2023-08-31 | End: 2023-09-30

## 2023-08-31 RX ORDER — OMEPRAZOLE 40 MG/1
40 CAPSULE, DELAYED RELEASE ORAL
Qty: 90 CAPSULE | Refills: 1 | Status: SHIPPED | OUTPATIENT
Start: 2023-08-31

## 2023-08-31 RX ORDER — ATORVASTATIN CALCIUM 20 MG/1
20 TABLET, FILM COATED ORAL DAILY
Qty: 90 TABLET | Refills: 1 | Status: SHIPPED | OUTPATIENT
Start: 2023-08-31

## 2023-08-31 RX ORDER — METHYLPREDNISOLONE 4 MG/1
TABLET ORAL
Qty: 1 KIT | Refills: 0 | Status: SHIPPED | OUTPATIENT
Start: 2023-08-31

## 2023-08-31 ASSESSMENT — ENCOUNTER SYMPTOMS
VOMITING: 0
ABDOMINAL PAIN: 0
EYE REDNESS: 0
NAUSEA: 0
DIARRHEA: 0
COUGH: 1
SORE THROAT: 0
CONSTIPATION: 0
RHINORRHEA: 0
EYE PAIN: 0
BACK PAIN: 0
GASTROINTESTINAL NEGATIVE: 1
SINUS PRESSURE: 0
SHORTNESS OF BREATH: 0
SINUS PAIN: 0
CHEST TIGHTNESS: 0
BLOOD IN STOOL: 0
EYES NEGATIVE: 1

## 2023-08-31 ASSESSMENT — PATIENT HEALTH QUESTIONNAIRE - PHQ9
2. FEELING DOWN, DEPRESSED OR HOPELESS: 0
SUM OF ALL RESPONSES TO PHQ QUESTIONS 1-9: 0
SUM OF ALL RESPONSES TO PHQ QUESTIONS 1-9: 0
SUM OF ALL RESPONSES TO PHQ9 QUESTIONS 1 & 2: 0
SUM OF ALL RESPONSES TO PHQ QUESTIONS 1-9: 0
SUM OF ALL RESPONSES TO PHQ QUESTIONS 1-9: 0
1. LITTLE INTEREST OR PLEASURE IN DOING THINGS: 0

## 2023-08-31 NOTE — PROGRESS NOTES
Assessment and Plan     1. Persistent cough: Imaging studies ordered. Will treat with medrol pack and antihistamine, mode of use discussed. Will also try PPI. Return instructions given. -     XR CHEST (2 VW); Future  -     methylPREDNISolone (MEDROL DOSEPACK) 4 MG tablet; Follow-up package instructions, Disp-1 kit, R-0Normal  -     cetirizine (ZYRTEC) 10 MG tablet; Take 1 tablet by mouth daily, Disp-30 tablet, R-0Normal  -     omeprazole (PRILOSEC) 40 MG delayed release capsule; Take 1 capsule by mouth every morning (before breakfast), Disp-90 capsule, R-1Normal  2. Mixed hyperlipidemia: Will start Lipitor, mode of use and possible side effects discussed. Heart healthy, mediterranean diet and physical acitivity discussed. Pt verbalized understanding.   -     atorvastatin (LIPITOR) 20 MG tablet; Take 1 tablet by mouth daily, Disp-90 tablet, R-1Normal  3. Elevated blood-pressure reading without diagnosis of hypertension: Has not checked BP at home. Blood pressure today at goal.   4. Elevated fasting glucose: Diet discussed. 5. Overweight (BMI 25.0-29. 9): Healthy lifestyle recommended. 6. Kidney stones: Chronic. [facility] to [facility], fully asymptomatic. Benefits, risks, possible drug interactions, and side effects of all new medications were reviewed with the patient. Pt verbalized understanding. An electronic signature was used to authenticate this note. GARY Yancey - CNP  8/31/2023    Follow-up and Dispositions    Return in about 6 months (around 2/29/2024). History of Present Illness   Chief Complaint     Malik Saldana is a 56 y.o. male here for had concerns including Discuss Labs. Pt presents today for follow up on cough and laboratory results. Admits he continues with dry cough, persistent for about 40 days. Currently taking Benzonatate as needed, has noted mild improvement. Denies fever, chills, fatigue, weight changes.  Pt also has hx of

## 2023-09-14 ENCOUNTER — HOSPITAL ENCOUNTER (OUTPATIENT)
Facility: HOSPITAL | Age: 48
Discharge: HOME | End: 2023-09-14
Payer: COMMERCIAL

## 2023-09-14 DIAGNOSIS — R05.3 PERSISTENT COUGH: ICD-10-CM

## 2023-09-14 PROCEDURE — 71046 X-RAY EXAM CHEST 2 VIEWS: CPT

## 2023-12-06 DIAGNOSIS — R05.3 PERSISTENT COUGH: ICD-10-CM

## 2023-12-06 RX ORDER — OMEPRAZOLE 40 MG/1
40 CAPSULE, DELAYED RELEASE ORAL
Qty: 90 CAPSULE | Refills: 1 | Status: SHIPPED | OUTPATIENT
Start: 2023-12-06

## 2024-01-25 ENCOUNTER — HOSPITAL ENCOUNTER (EMERGENCY)
Facility: HOSPITAL | Age: 49
Discharge: HOME | End: 2024-01-25
Attending: STUDENT IN AN ORGANIZED HEALTH CARE EDUCATION/TRAINING PROGRAM
Payer: COMMERCIAL

## 2024-01-25 ENCOUNTER — APPOINTMENT (OUTPATIENT)
Facility: HOSPITAL | Age: 49
End: 2024-01-25
Payer: COMMERCIAL

## 2024-01-25 VITALS
HEART RATE: 74 BPM | OXYGEN SATURATION: 100 % | HEIGHT: 78 IN | DIASTOLIC BLOOD PRESSURE: 89 MMHG | SYSTOLIC BLOOD PRESSURE: 134 MMHG | TEMPERATURE: 98.4 F | WEIGHT: 230 LBS | BODY MASS INDEX: 26.61 KG/M2 | RESPIRATION RATE: 18 BRPM

## 2024-01-25 DIAGNOSIS — H53.9 VISUAL DISTURBANCE: Primary | ICD-10-CM

## 2024-01-25 LAB
ALBUMIN SERPL-MCNC: 4.2 G/DL (ref 3.5–5)
ALBUMIN/GLOB SERPL: 1.1 (ref 1.1–2.2)
ALP SERPL-CCNC: 82 U/L (ref 45–117)
ALT SERPL-CCNC: 29 U/L (ref 12–78)
ANION GAP SERPL CALC-SCNC: 5 MMOL/L (ref 5–15)
AST SERPL-CCNC: 12 U/L (ref 15–37)
BASOPHILS # BLD: 0 K/UL (ref 0–0.1)
BASOPHILS NFR BLD: 0 % (ref 0–1)
BILIRUB SERPL-MCNC: 0.5 MG/DL (ref 0.2–1)
BUN SERPL-MCNC: 14 MG/DL (ref 6–20)
BUN/CREAT SERPL: 12 (ref 12–20)
CALCIUM SERPL-MCNC: 9.2 MG/DL (ref 8.5–10.1)
CHLORIDE SERPL-SCNC: 106 MMOL/L (ref 97–108)
CO2 SERPL-SCNC: 30 MMOL/L (ref 21–32)
CREAT SERPL-MCNC: 1.2 MG/DL (ref 0.7–1.3)
DIFFERENTIAL METHOD BLD: NORMAL
EOSINOPHIL # BLD: 0.1 K/UL (ref 0–0.4)
EOSINOPHIL NFR BLD: 1 % (ref 0–7)
ERYTHROCYTE [DISTWIDTH] IN BLOOD BY AUTOMATED COUNT: 12.2 % (ref 11.5–14.5)
GLOBULIN SER CALC-MCNC: 3.9 G/DL (ref 2–4)
GLUCOSE SERPL-MCNC: 110 MG/DL (ref 65–100)
HCT VFR BLD AUTO: 43.8 % (ref 36.6–50.3)
HGB BLD-MCNC: 15.2 G/DL (ref 12.1–17)
IMM GRANULOCYTES # BLD AUTO: 0 K/UL (ref 0–0.04)
IMM GRANULOCYTES NFR BLD AUTO: 0 % (ref 0–0.5)
LYMPHOCYTES # BLD: 1.6 K/UL (ref 0.8–3.5)
LYMPHOCYTES NFR BLD: 33 % (ref 12–49)
MAGNESIUM SERPL-MCNC: 2.3 MG/DL (ref 1.6–2.4)
MCH RBC QN AUTO: 30.8 PG (ref 26–34)
MCHC RBC AUTO-ENTMCNC: 34.7 G/DL (ref 30–36.5)
MCV RBC AUTO: 88.8 FL (ref 80–99)
MONOCYTES # BLD: 0.3 K/UL (ref 0–1)
MONOCYTES NFR BLD: 7 % (ref 5–13)
NEUTS SEG # BLD: 2.9 K/UL (ref 1.8–8)
NEUTS SEG NFR BLD: 59 % (ref 32–75)
NRBC # BLD: 0 K/UL (ref 0–0.01)
NRBC BLD-RTO: 0 PER 100 WBC
PLATELET # BLD AUTO: 220 K/UL (ref 150–400)
PMV BLD AUTO: 10.7 FL (ref 8.9–12.9)
POTASSIUM SERPL-SCNC: 3.9 MMOL/L (ref 3.5–5.1)
PROT SERPL-MCNC: 8.1 G/DL (ref 6.4–8.2)
RBC # BLD AUTO: 4.93 M/UL (ref 4.1–5.7)
SODIUM SERPL-SCNC: 141 MMOL/L (ref 136–145)
WBC # BLD AUTO: 4.9 K/UL (ref 4.1–11.1)

## 2024-01-25 PROCEDURE — 85025 COMPLETE CBC W/AUTO DIFF WBC: CPT

## 2024-01-25 PROCEDURE — 80053 COMPREHEN METABOLIC PANEL: CPT

## 2024-01-25 PROCEDURE — 93005 ELECTROCARDIOGRAM TRACING: CPT | Performed by: STUDENT IN AN ORGANIZED HEALTH CARE EDUCATION/TRAINING PROGRAM

## 2024-01-25 PROCEDURE — 36415 COLL VENOUS BLD VENIPUNCTURE: CPT

## 2024-01-25 PROCEDURE — 83735 ASSAY OF MAGNESIUM: CPT

## 2024-01-25 PROCEDURE — 99284 EMERGENCY DEPT VISIT MOD MDM: CPT

## 2024-01-25 PROCEDURE — 70450 CT HEAD/BRAIN W/O DYE: CPT

## 2024-01-25 ASSESSMENT — ENCOUNTER SYMPTOMS
EYE DISCHARGE: 0
EYE REDNESS: 0
DIARRHEA: 0
COUGH: 0
SHORTNESS OF BREATH: 0
VOMITING: 0

## 2024-01-25 ASSESSMENT — PAIN - FUNCTIONAL ASSESSMENT: PAIN_FUNCTIONAL_ASSESSMENT: NONE - DENIES PAIN

## 2024-01-26 LAB
EKG ATRIAL RATE: 73 BPM
EKG DIAGNOSIS: NORMAL
EKG P AXIS: 42 DEGREES
EKG P-R INTERVAL: 154 MS
EKG Q-T INTERVAL: 376 MS
EKG QRS DURATION: 106 MS
EKG QTC CALCULATION (BAZETT): 414 MS
EKG R AXIS: 90 DEGREES
EKG T AXIS: 78 DEGREES
EKG VENTRICULAR RATE: 73 BPM

## 2024-01-26 PROCEDURE — 93010 ELECTROCARDIOGRAM REPORT: CPT | Performed by: STUDENT IN AN ORGANIZED HEALTH CARE EDUCATION/TRAINING PROGRAM

## 2024-01-26 NOTE — ED TRIAGE NOTES
Pt ambulatory to triage w/ c/o bright white flashing lights in eyes. Pt reports this happening before and seeing a neurologist who told him he had lesions on brian, but there was nothing the could do. Pt also had an episode of this on Saturday. Pt denies headache. Denies N/V.

## 2024-01-26 NOTE — ED PROVIDER NOTES
[facility] EMERGENCY DEPT  EMERGENCY DEPARTMENT ENCOUNTER      Pt Name: Jesse Jimenez  MRN: [id number]  Birthdate 1975  Date of evaluation: 1/25/2024  Provider: Ladonna Black DO    CHIEF COMPLAINT       Chief Complaint   Patient presents with    Eye Problem         HISTORY OF PRESENT ILLNESS    HPI    Jesse Jimenez is a 48 y.o. male with a history of hyperlipidemia who presents to the emergency department for evaluation of vision changes.  Patient reports earlier this evening he noticed bright flashing lights in bilateral eyes.  States he felt like there were white spots that were obstructing his vision.  Lasted approximately 15 to 30 minutes and self resolved.  Notes similar symptoms on Saturday.  Also states he had several episodes of this 10 years ago, reportedly seen by neurologist and was told he had \"lesions on my brain, but there was nothing they could do.\"  He denies any associated headaches.  No other recent illness.    Nursing Notes were reviewed.    REVIEW OF SYSTEMS       Review of Systems   Constitutional:  Negative for chills and fever.   HENT:  Negative for congestion and rhinorrhea.    Eyes:  Positive for visual disturbance. Negative for discharge and redness.   Respiratory:  Negative for cough and shortness of breath.    Gastrointestinal:  Negative for diarrhea, nausea and vomiting.   Neurological:  Negative for speech difficulty and headaches.   Psychiatric/Behavioral:  Negative for agitation.            PAST MEDICAL HISTORY     Past Medical History:   Diagnosis Date    Hyperlipidemia     Kidney stone     Other ill-defined conditions(799.89)     \"lesions on brain\"         SURGICAL HISTORY       Past Surgical History:   Procedure Laterality Date    HEENT      wisdom teeth          CURRENT MEDICATIONS       Discharge Medication List as of 1/25/2024 10:44 PM        CONTINUE these medications which have NOT CHANGED    Details   omeprazole (PRILOSEC) 40 MG delayed release capsule Take 1

## 2024-02-08 ENCOUNTER — OFFICE VISIT (OUTPATIENT)
Age: 49
End: 2024-02-08
Payer: COMMERCIAL

## 2024-02-08 VITALS
TEMPERATURE: 98.2 F | BODY MASS INDEX: 26.61 KG/M2 | OXYGEN SATURATION: 99 % | HEART RATE: 77 BPM | SYSTOLIC BLOOD PRESSURE: 126 MMHG | DIASTOLIC BLOOD PRESSURE: 78 MMHG | RESPIRATION RATE: 16 BRPM | HEIGHT: 78 IN | WEIGHT: 230 LBS

## 2024-02-08 DIAGNOSIS — K21.9 GASTROESOPHAGEAL REFLUX DISEASE WITHOUT ESOPHAGITIS: ICD-10-CM

## 2024-02-08 DIAGNOSIS — G93.9 BRAIN LESION: Primary | ICD-10-CM

## 2024-02-08 DIAGNOSIS — R05.3 PERSISTENT COUGH: ICD-10-CM

## 2024-02-08 DIAGNOSIS — Z12.11 SCREENING FOR COLON CANCER: ICD-10-CM

## 2024-02-08 PROCEDURE — 99213 OFFICE O/P EST LOW 20 MIN: CPT | Performed by: NURSE PRACTITIONER

## 2024-02-08 ASSESSMENT — PATIENT HEALTH QUESTIONNAIRE - PHQ9
SUM OF ALL RESPONSES TO PHQ9 QUESTIONS 1 & 2: 0
SUM OF ALL RESPONSES TO PHQ QUESTIONS 1-9: 0
2. FEELING DOWN, DEPRESSED OR HOPELESS: 0
SUM OF ALL RESPONSES TO PHQ QUESTIONS 1-9: 0
1. LITTLE INTEREST OR PLEASURE IN DOING THINGS: 0

## 2024-02-08 ASSESSMENT — ENCOUNTER SYMPTOMS
SINUS PAIN: 0
ABDOMINAL PAIN: 0
RHINORRHEA: 0
BLOOD IN STOOL: 0
SINUS PRESSURE: 0
EYE REDNESS: 0
VOMITING: 0
BACK PAIN: 0
CHEST TIGHTNESS: 0
SHORTNESS OF BREATH: 0
DIARRHEA: 0
CONSTIPATION: 0
COUGH: 0
EYE PAIN: 0
GASTROINTESTINAL NEGATIVE: 1
EYES NEGATIVE: 1
RESPIRATORY NEGATIVE: 1
NAUSEA: 0

## 2024-02-08 NOTE — PROGRESS NOTES
Assessment and Plan     1. Brain lesion: CT done in ER did not show any abnormalities. Vision changes has completed resolved, does admit these are intermittent but resolve quickly after onset. Encouraged to make appt with ophthalmologist. SXS to seek urgent care were discussed. Pt verbalized understanding.   2. Gastroesophageal reflux disease without esophagitis: Took PPI for 3 months with improvement, manages symptoms with lifestyle changes. Continue to avoid triggers. Pt verbalized understanding.   3. Persistent cough: Resolved.   4. Screening for colon cancer  -     ELIO - Frankie Montana MD, Gastroenterology, [place]       Benefits, risks, possible drug interactions, and side effects of all new medications were reviewed with the patient. Pt verbalized understanding.    An electronic signature was used to authenticate this note.  Tamika Gan, GARY - CNP  2/8/2024      Follow-up and Dispositions    Return if symptoms worsen or fail to improve.          History of Present Illness   Chief Complaint     Jesse Jimenez is a 48 y.o. male here for had concerns including Follow-up (Mr. Jimenez presents today to follow up after ER visit for eye problem. Patient was assisted at [facility] ED on 01/25/24. ).   Mr. Jimenez presents today for ER visit follow up. Pt presented to [facility] ER on 01/25/2024 due to vision changes. He was experiencing bright flashing lights in bilateral eyes, reported white spots were obstructions his vision. Pt was also having R hand numbness, lasted for 2 minutes and resolved on its own. Symptoms lasted for about 30 minutes and resolved before arriving to ER. He was referred to ophthalmologist. Has not seen neurologist, last seen in 2021. Admits symptoms had completely resolved. Denies dizziness, shortness of breath.       Review of Systems  Review of Systems   Constitutional: Negative.  Negative for chills, fatigue, fever and unexpected weight change.   HENT: Negative.  Negative for congestion,

## 2025-08-07 ENCOUNTER — OFFICE VISIT (OUTPATIENT)
Facility: CLINIC | Age: 50
End: 2025-08-07
Payer: COMMERCIAL

## 2025-08-07 VITALS
WEIGHT: 238.2 LBS | RESPIRATION RATE: 16 BRPM | TEMPERATURE: 97.9 F | SYSTOLIC BLOOD PRESSURE: 140 MMHG | DIASTOLIC BLOOD PRESSURE: 86 MMHG | BODY MASS INDEX: 27.56 KG/M2 | HEART RATE: 86 BPM | HEIGHT: 78 IN | OXYGEN SATURATION: 98 %

## 2025-08-07 DIAGNOSIS — R73.01 ELEVATED FASTING GLUCOSE: ICD-10-CM

## 2025-08-07 DIAGNOSIS — R11.0 NAUSEA: ICD-10-CM

## 2025-08-07 DIAGNOSIS — H53.8 BLURRY VISION: ICD-10-CM

## 2025-08-07 DIAGNOSIS — R53.83 FATIGUE, UNSPECIFIED TYPE: ICD-10-CM

## 2025-08-07 DIAGNOSIS — R42 VERTIGO: Primary | ICD-10-CM

## 2025-08-07 DIAGNOSIS — E78.2 MIXED HYPERLIPIDEMIA: ICD-10-CM

## 2025-08-07 DIAGNOSIS — F34.1 DYSTHYMIA: ICD-10-CM

## 2025-08-07 DIAGNOSIS — R03.0 ELEVATED BLOOD-PRESSURE READING WITHOUT DIAGNOSIS OF HYPERTENSION: ICD-10-CM

## 2025-08-07 DIAGNOSIS — Z12.5 ENCOUNTER FOR PROSTATE CANCER SCREENING: ICD-10-CM

## 2025-08-07 PROCEDURE — 1036F TOBACCO NON-USER: CPT | Performed by: NURSE PRACTITIONER

## 2025-08-07 PROCEDURE — G8419 CALC BMI OUT NRM PARAM NOF/U: HCPCS | Performed by: NURSE PRACTITIONER

## 2025-08-07 PROCEDURE — 99214 OFFICE O/P EST MOD 30 MIN: CPT | Performed by: NURSE PRACTITIONER

## 2025-08-07 PROCEDURE — G8427 DOCREV CUR MEDS BY ELIG CLIN: HCPCS | Performed by: NURSE PRACTITIONER

## 2025-08-07 PROCEDURE — 93000 ELECTROCARDIOGRAM COMPLETE: CPT | Performed by: NURSE PRACTITIONER

## 2025-08-07 RX ORDER — MECLIZINE HCL 12.5 MG 12.5 MG/1
12.5 TABLET ORAL 3 TIMES DAILY PRN
Qty: 15 TABLET | Refills: 0 | Status: SHIPPED | OUTPATIENT
Start: 2025-08-07 | End: 2025-08-17

## 2025-08-07 RX ORDER — ATORVASTATIN CALCIUM 20 MG/1
20 TABLET, FILM COATED ORAL DAILY
Qty: 90 TABLET | Refills: 1 | Status: SHIPPED | OUTPATIENT
Start: 2025-08-07

## 2025-08-07 SDOH — ECONOMIC STABILITY: FOOD INSECURITY: WITHIN THE PAST 12 MONTHS, YOU WORRIED THAT YOUR FOOD WOULD RUN OUT BEFORE YOU GOT MONEY TO BUY MORE.: NEVER TRUE

## 2025-08-07 SDOH — ECONOMIC STABILITY: FOOD INSECURITY: WITHIN THE PAST 12 MONTHS, THE FOOD YOU BOUGHT JUST DIDN'T LAST AND YOU DIDN'T HAVE MONEY TO GET MORE.: NEVER TRUE

## 2025-08-07 ASSESSMENT — ENCOUNTER SYMPTOMS
ABDOMINAL PAIN: 0
EYE PAIN: 0
CHEST TIGHTNESS: 0
VOMITING: 0
BACK PAIN: 0
EYE DISCHARGE: 0
SINUS PAIN: 0
RHINORRHEA: 0
BLOOD IN STOOL: 0
RESPIRATORY NEGATIVE: 1
COUGH: 0
SHORTNESS OF BREATH: 0
DIARRHEA: 0
EYE REDNESS: 0
EYE ITCHING: 0
CONSTIPATION: 0
SINUS PRESSURE: 0
NAUSEA: 1

## 2025-08-07 ASSESSMENT — PATIENT HEALTH QUESTIONNAIRE - PHQ9
3. TROUBLE FALLING OR STAYING ASLEEP: MORE THAN HALF THE DAYS
6. FEELING BAD ABOUT YOURSELF - OR THAT YOU ARE A FAILURE OR HAVE LET YOURSELF OR YOUR FAMILY DOWN: SEVERAL DAYS
10. IF YOU CHECKED OFF ANY PROBLEMS, HOW DIFFICULT HAVE THESE PROBLEMS MADE IT FOR YOU TO DO YOUR WORK, TAKE CARE OF THINGS AT HOME, OR GET ALONG WITH OTHER PEOPLE: SOMEWHAT DIFFICULT
5. POOR APPETITE OR OVEREATING: NOT AT ALL
SUM OF ALL RESPONSES TO PHQ QUESTIONS 1-9: 10
9. THOUGHTS THAT YOU WOULD BE BETTER OFF DEAD, OR OF HURTING YOURSELF: NOT AT ALL
8. MOVING OR SPEAKING SO SLOWLY THAT OTHER PEOPLE COULD HAVE NOTICED. OR THE OPPOSITE, BEING SO FIGETY OR RESTLESS THAT YOU HAVE BEEN MOVING AROUND A LOT MORE THAN USUAL: NOT AT ALL
4. FEELING TIRED OR HAVING LITTLE ENERGY: NEARLY EVERY DAY
2. FEELING DOWN, DEPRESSED OR HOPELESS: SEVERAL DAYS
7. TROUBLE CONCENTRATING ON THINGS, SUCH AS READING THE NEWSPAPER OR WATCHING TELEVISION: MORE THAN HALF THE DAYS
SUM OF ALL RESPONSES TO PHQ QUESTIONS 1-9: 10
SUM OF ALL RESPONSES TO PHQ QUESTIONS 1-9: 10
1. LITTLE INTEREST OR PLEASURE IN DOING THINGS: SEVERAL DAYS
SUM OF ALL RESPONSES TO PHQ QUESTIONS 1-9: 10

## 2025-08-08 DIAGNOSIS — R53.83 FATIGUE, UNSPECIFIED TYPE: ICD-10-CM

## 2025-08-08 DIAGNOSIS — H53.8 BLURRY VISION: ICD-10-CM

## 2025-08-08 DIAGNOSIS — R11.0 NAUSEA: ICD-10-CM

## 2025-08-08 DIAGNOSIS — E78.2 MIXED HYPERLIPIDEMIA: ICD-10-CM

## 2025-08-08 DIAGNOSIS — F34.1 DYSTHYMIA: ICD-10-CM

## 2025-08-08 DIAGNOSIS — R73.01 ELEVATED FASTING GLUCOSE: ICD-10-CM

## 2025-08-08 DIAGNOSIS — Z12.5 ENCOUNTER FOR PROSTATE CANCER SCREENING: ICD-10-CM

## 2025-08-08 DIAGNOSIS — R42 VERTIGO: ICD-10-CM

## 2025-08-09 LAB
25(OH)D3 SERPL-MCNC: 28.3 NG/ML (ref 30–100)
ALBUMIN SERPL-MCNC: 4 G/DL (ref 3.5–5.2)
ALBUMIN/GLOB SERPL: 1.2 (ref 1.1–2.2)
ALP SERPL-CCNC: 83 U/L (ref 40–129)
ALT SERPL-CCNC: 30 U/L (ref 10–50)
ANION GAP SERPL CALC-SCNC: 10 MMOL/L (ref 2–14)
AST SERPL-CCNC: 20 U/L (ref 10–50)
BASOPHILS # BLD: 0.02 K/UL (ref 0–0.1)
BASOPHILS NFR BLD: 0.4 % (ref 0–1)
BILIRUB SERPL-MCNC: 0.4 MG/DL (ref 0–1.2)
BUN SERPL-MCNC: 13 MG/DL (ref 6–20)
BUN/CREAT SERPL: 12 (ref 12–20)
CALCIUM SERPL-MCNC: 9.7 MG/DL (ref 8.6–10)
CHLORIDE SERPL-SCNC: 105 MMOL/L (ref 98–107)
CHOLEST SERPL-MCNC: 287 MG/DL (ref 0–200)
CO2 SERPL-SCNC: 27 MMOL/L (ref 20–29)
CREAT SERPL-MCNC: 1.14 MG/DL (ref 0.7–1.2)
DIFFERENTIAL METHOD BLD: NORMAL
EOSINOPHIL # BLD: 0.08 K/UL (ref 0–0.4)
EOSINOPHIL NFR BLD: 1.7 % (ref 0–7)
ERYTHROCYTE [DISTWIDTH] IN BLOOD BY AUTOMATED COUNT: 12.3 % (ref 11.5–14.5)
EST. AVERAGE GLUCOSE BLD GHB EST-MCNC: 110 MG/DL
FOLATE SERPL-MCNC: 13.2 NG/ML (ref 7–140)
GLOBULIN SER CALC-MCNC: 3.2 G/DL (ref 2–4)
GLUCOSE SERPL-MCNC: 102 MG/DL (ref 65–100)
HBA1C MFR BLD: 5.5 % (ref 4–5.6)
HCT VFR BLD AUTO: 46.3 % (ref 36.6–50.3)
HDLC SERPL-MCNC: 41 MG/DL (ref 40–60)
HDLC SERPL: 6.9 (ref 0–5)
HGB BLD-MCNC: 15.3 G/DL (ref 12.1–17)
IMM GRANULOCYTES # BLD AUTO: 0.01 K/UL (ref 0–0.04)
IMM GRANULOCYTES NFR BLD AUTO: 0.2 % (ref 0–0.5)
LDLC SERPL CALC-MCNC: 189 MG/DL
LYMPHOCYTES # BLD: 1.72 K/UL (ref 0.8–3.5)
LYMPHOCYTES NFR BLD: 36.4 % (ref 12–49)
MCH RBC QN AUTO: 30.5 PG (ref 26–34)
MCHC RBC AUTO-ENTMCNC: 33 G/DL (ref 30–36.5)
MCV RBC AUTO: 92.4 FL (ref 80–99)
MONOCYTES # BLD: 0.38 K/UL (ref 0–1)
MONOCYTES NFR BLD: 8 % (ref 5–13)
NEUTS SEG # BLD: 2.52 K/UL (ref 1.8–8)
NEUTS SEG NFR BLD: 53.3 % (ref 32–75)
NRBC # BLD: 0 K/UL (ref 0–0.01)
NRBC BLD-RTO: 0 PER 100 WBC
PLATELET # BLD AUTO: 210 K/UL (ref 150–400)
PMV BLD AUTO: 11.3 FL (ref 8.9–12.9)
POTASSIUM SERPL-SCNC: 4.6 MMOL/L (ref 3.5–5.1)
PROT SERPL-MCNC: 7.2 G/DL (ref 6.4–8.3)
PSA SERPL-MCNC: 0.5 NG/ML (ref 0–2)
RBC # BLD AUTO: 5.01 M/UL (ref 4.1–5.7)
SODIUM SERPL-SCNC: 142 MMOL/L (ref 136–145)
TRIGL SERPL-MCNC: 286 MG/DL (ref 0–150)
TSH, 3RD GENERATION: 2.76 UIU/ML (ref 0.27–4.2)
VIT B12 SERPL-MCNC: 372 PG/ML (ref 232–1245)
VLDLC SERPL CALC-MCNC: 57 MG/DL
WBC # BLD AUTO: 4.7 K/UL (ref 4.1–11.1)